# Patient Record
Sex: MALE | Race: WHITE
[De-identification: names, ages, dates, MRNs, and addresses within clinical notes are randomized per-mention and may not be internally consistent; named-entity substitution may affect disease eponyms.]

---

## 2020-06-13 ENCOUNTER — HOSPITAL ENCOUNTER (OUTPATIENT)
Dept: HOSPITAL 4 - SLB | Age: 25
Discharge: HOME | End: 2020-06-13
Attending: SPECIALIST
Payer: SELF-PAY

## 2020-06-13 DIAGNOSIS — Z02.83: Primary | ICD-10-CM

## 2020-06-13 LAB
AMPHETAMINES UR QL SCN: NEGATIVE
BARBITURATES UR QL SCN: NEGATIVE
BENZODIAZ UR QL SCN: NEGATIVE
BZE UR QL SCN: NEGATIVE
CANNABINOIDS UR QL SCN: POSITIVE
METHADONE UR-SCNC: NEGATIVE UMOL/L
METHAMPHET UR-SCNC: NEGATIVE UMOL/L
OPIATES UR QL SCN: NEGATIVE
OXYCODONE SERPL-MCNC: NEGATIVE NG/ML
PCP UR QL SCN: NEGATIVE
TRICYCLICS UR-MCNC: NEGATIVE NG/ML
URINE PROPOXYPHENE SCREEN: NEGATIVE

## 2022-11-17 ENCOUNTER — HOSPITAL ENCOUNTER (EMERGENCY)
Dept: HOSPITAL 4 - SED | Age: 27
Discharge: HOME | End: 2022-11-17
Payer: MEDICAID

## 2022-11-17 VITALS — SYSTOLIC BLOOD PRESSURE: 136 MMHG

## 2022-11-17 VITALS — BODY MASS INDEX: 26.58 KG/M2 | WEIGHT: 150 LBS | HEIGHT: 63 IN

## 2022-11-17 VITALS — SYSTOLIC BLOOD PRESSURE: 123 MMHG

## 2022-11-17 DIAGNOSIS — L03.012: Primary | ICD-10-CM

## 2022-11-17 DIAGNOSIS — Z79.899: ICD-10-CM

## 2022-11-17 NOTE — NUR
26 y/o M,ambulatory from home with c/o redness and pain to R ring finger since 
this am. Denies any injury/trauma to site. Arrived to ED in no acute distress.

## 2022-11-17 NOTE — NUR
Patient given written and verbal discharge instructions and verbalizes 
understanding.  ER MD Urbina discussed with patient the results. Patient in stable 
condition. ID arm band removed. 

Rx of Clindamycin and Ibuprofen sent to preferred pharmacy. Patient educated on 
pain management and to follow up with PMD. 

Opportunity for questions provided and answered. Medication side effect fact 
sheet provided.